# Patient Record
Sex: FEMALE | Race: WHITE
[De-identification: names, ages, dates, MRNs, and addresses within clinical notes are randomized per-mention and may not be internally consistent; named-entity substitution may affect disease eponyms.]

---

## 2021-11-19 ENCOUNTER — HOSPITAL ENCOUNTER (OUTPATIENT)
Dept: HOSPITAL 61 - ONCLAB | Age: 76
End: 2021-11-19
Attending: INTERNAL MEDICINE
Payer: MEDICARE

## 2021-11-19 DIAGNOSIS — C90.00: Primary | ICD-10-CM

## 2021-11-19 LAB
ALBUMIN SERPL-MCNC: 2.8 G/DL (ref 3.4–5)
ALBUMIN/GLOB SERPL: 0.4 {RATIO} (ref 1–1.7)
ALP SERPL-CCNC: 78 U/L (ref 46–116)
ALT SERPL-CCNC: 24 U/L (ref 14–59)
ANION GAP SERPL CALC-SCNC: 7 MMOL/L (ref 6–14)
AST SERPL-CCNC: 19 U/L (ref 15–37)
BASOPHILS # BLD AUTO: 0 X10^3/UL (ref 0–0.2)
BASOPHILS NFR BLD: 1 % (ref 0–3)
BILIRUB SERPL-MCNC: 0.4 MG/DL (ref 0.2–1)
BUN SERPL-MCNC: 20 MG/DL (ref 7–20)
BUN/CREAT SERPL: 20 (ref 6–20)
CALCIUM SERPL-MCNC: 9.4 MG/DL (ref 8.5–10.1)
CHLORIDE SERPL-SCNC: 96 MMOL/L (ref 98–107)
CO2 SERPL-SCNC: 30 MMOL/L (ref 21–32)
CREAT SERPL-MCNC: 1 MG/DL (ref 0.6–1)
EOSINOPHIL NFR BLD: 0 X10^3/UL (ref 0–0.7)
EOSINOPHIL NFR BLD: 1 % (ref 0–3)
ERYTHROCYTE [DISTWIDTH] IN BLOOD BY AUTOMATED COUNT: 15.5 % (ref 11.5–14.5)
GFR SERPLBLD BASED ON 1.73 SQ M-ARVRAT: 54.2 ML/MIN
GLUCOSE SERPL-MCNC: 92 MG/DL (ref 70–99)
HCT VFR BLD CALC: 26.6 % (ref 36–47)
HGB BLD-MCNC: 8.8 G/DL (ref 12–15.5)
LYMPHOCYTES # BLD: 1.9 X10^3/UL (ref 1–4.8)
LYMPHOCYTES NFR BLD AUTO: 37 % (ref 24–48)
MCH RBC QN AUTO: 35 PG (ref 25–35)
MCHC RBC AUTO-ENTMCNC: 33 G/DL (ref 31–37)
MCV RBC AUTO: 105 FL (ref 79–100)
MONO #: 0.4 X10^3/UL (ref 0–1.1)
MONOCYTES NFR BLD: 8 % (ref 0–9)
NEUT #: 2.8 X10^3/UL (ref 1.8–7.7)
NEUTROPHILS NFR BLD AUTO: 54 % (ref 31–73)
PLATELET # BLD AUTO: 311 X10^3/UL (ref 140–400)
POTASSIUM SERPL-SCNC: 3.6 MMOL/L (ref 3.5–5.1)
PROT SERPL-MCNC: 9.3 G/DL (ref 6.4–8.2)
RBC # BLD AUTO: 2.54 X10^6/UL (ref 3.5–5.4)
SODIUM SERPL-SCNC: 133 MMOL/L (ref 136–145)
WBC # BLD AUTO: 5.2 X10^3/UL (ref 4–11)

## 2021-11-19 PROCEDURE — 82232 ASSAY OF BETA-2 PROTEIN: CPT

## 2021-11-19 PROCEDURE — 82784 ASSAY IGA/IGD/IGG/IGM EACH: CPT

## 2021-11-19 PROCEDURE — 85025 COMPLETE CBC W/AUTO DIFF WBC: CPT

## 2021-11-19 PROCEDURE — 82746 ASSAY OF FOLIC ACID SERUM: CPT

## 2021-11-19 PROCEDURE — 86334 IMMUNOFIX E-PHORESIS SERUM: CPT

## 2021-11-19 PROCEDURE — 83520 IMMUNOASSAY QUANT NOS NONAB: CPT

## 2021-11-19 PROCEDURE — 84165 PROTEIN E-PHORESIS SERUM: CPT

## 2021-11-19 PROCEDURE — 82607 VITAMIN B-12: CPT

## 2021-11-19 PROCEDURE — 80053 COMPREHEN METABOLIC PANEL: CPT

## 2021-11-19 PROCEDURE — 36415 COLL VENOUS BLD VENIPUNCTURE: CPT

## 2021-11-19 PROCEDURE — 82525 ASSAY OF COPPER: CPT

## 2021-11-22 LAB
ALBUM: 3.5 G/DL (ref 2.9–4.4)
ALPHA1 GLOB SERPL ELPH-MCNC: 0.3 G/DL (ref 0–0.4)
ALPHA2 GLOB SERPL ELPH-MCNC: 0.8 G/DL (ref 0.4–1)
B-GLOBULIN SERPL ELPH-MCNC: 0.8 G/DL (ref 0.7–1.3)
GAMMA GLOB SERPL ELPH-MCNC: 3.8 G/DL (ref 0.4–1.8)
IGA SERPL-MCNC: 25 MG/DL (ref 64–422)
IGG SERPL-MCNC: 4912 MG/DL (ref 586–1602)
IGM SERPL-MCNC: 25 MG/DL (ref 26–217)
PROT SERPL-MCNC: 9.2 G/DL (ref 6–8.5)
SPEP AG RATIO: 0.6 (ref 0.7–1.7)

## 2021-11-23 LAB
KAPPA LC SERPL-MCNC: 105.4 MG/L (ref 3.3–19.4)
KAPPA LC/LAMBDA SER: 4.04 {RATIO} (ref 0.26–1.65)
LAMBDA LC FREE SERPL-MCNC: 26.1 MG/L (ref 5.7–26.3)

## 2022-01-07 ENCOUNTER — HOSPITAL ENCOUNTER (EMERGENCY)
Dept: HOSPITAL 61 - ER | Age: 77
LOS: 1 days | Discharge: HOME | End: 2022-01-08
Payer: MEDICARE

## 2022-01-07 VITALS — HEIGHT: 58 IN | WEIGHT: 118.17 LBS | BODY MASS INDEX: 24.8 KG/M2

## 2022-01-07 DIAGNOSIS — R91.1: ICD-10-CM

## 2022-01-07 DIAGNOSIS — F17.200: ICD-10-CM

## 2022-01-07 DIAGNOSIS — D53.9: Primary | ICD-10-CM

## 2022-01-07 DIAGNOSIS — C90.00: ICD-10-CM

## 2022-01-07 LAB
ALBUMIN SERPL-MCNC: 2.6 G/DL (ref 3.4–5)
ALBUMIN/GLOB SERPL: 0.4 {RATIO} (ref 1–1.7)
ALP SERPL-CCNC: 128 U/L (ref 46–116)
ALT SERPL-CCNC: 17 U/L (ref 14–59)
ANION GAP SERPL CALC-SCNC: -5 MMOL/L (ref 6–14)
APTT BLD: 26 SEC (ref 24–38)
AST SERPL-CCNC: 11 U/L (ref 15–37)
BASOPHILS # BLD AUTO: 0 X10^3/UL (ref 0–0.2)
BASOPHILS NFR BLD: 1 % (ref 0–3)
BILIRUB SERPL-MCNC: 0.2 MG/DL (ref 0.2–1)
BUN SERPL-MCNC: 12 MG/DL (ref 7–20)
BUN/CREAT SERPL: 17 (ref 6–20)
CALCIUM SERPL-MCNC: 8.2 MG/DL (ref 8.5–10.1)
CHLORIDE SERPL-SCNC: 99 MMOL/L (ref 98–107)
CO2 SERPL-SCNC: 43 MMOL/L (ref 21–32)
CREAT SERPL-MCNC: 0.7 MG/DL (ref 0.6–1)
EOSINOPHIL NFR BLD: 0 X10^3/UL (ref 0–0.7)
EOSINOPHIL NFR BLD: 1 % (ref 0–3)
ERYTHROCYTE [DISTWIDTH] IN BLOOD BY AUTOMATED COUNT: 15.8 % (ref 11.5–14.5)
GFR SERPLBLD BASED ON 1.73 SQ M-ARVRAT: 81.8 ML/MIN
GLUCOSE SERPL-MCNC: 137 MG/DL (ref 70–99)
HCT VFR BLD CALC: 22.9 % (ref 36–47)
HGB BLD-MCNC: 7.9 G/DL (ref 12–15.5)
LYMPHOCYTES # BLD: 1.5 X10^3/UL (ref 1–4.8)
LYMPHOCYTES NFR BLD AUTO: 37 % (ref 24–48)
MCH RBC QN AUTO: 35 PG (ref 25–35)
MCHC RBC AUTO-ENTMCNC: 34 G/DL (ref 31–37)
MCV RBC AUTO: 103 FL (ref 79–100)
MONO #: 0.4 X10^3/UL (ref 0–1.1)
MONOCYTES NFR BLD: 10 % (ref 0–9)
NEUT #: 2 X10^3/UL (ref 1.8–7.7)
NEUTROPHILS NFR BLD AUTO: 51 % (ref 31–73)
PLATELET # BLD AUTO: 313 X10^3/UL (ref 140–400)
POTASSIUM SERPL-SCNC: 3.3 MMOL/L (ref 3.5–5.1)
PROT SERPL-MCNC: 8.5 G/DL (ref 6.4–8.2)
PROTHROMBIN TIME: 13.2 SEC (ref 11.7–14)
RBC # BLD AUTO: 2.22 X10^6/UL (ref 3.5–5.4)
SODIUM SERPL-SCNC: 137 MMOL/L (ref 136–145)
WBC # BLD AUTO: 4 X10^3/UL (ref 4–11)

## 2022-01-07 PROCEDURE — 85025 COMPLETE CBC W/AUTO DIFF WBC: CPT

## 2022-01-07 PROCEDURE — 84484 ASSAY OF TROPONIN QUANT: CPT

## 2022-01-07 PROCEDURE — 71275 CT ANGIOGRAPHY CHEST: CPT

## 2022-01-07 PROCEDURE — 86850 RBC ANTIBODY SCREEN: CPT

## 2022-01-07 PROCEDURE — 93005 ELECTROCARDIOGRAM TRACING: CPT

## 2022-01-07 PROCEDURE — 96361 HYDRATE IV INFUSION ADD-ON: CPT

## 2022-01-07 PROCEDURE — 85018 HEMOGLOBIN: CPT

## 2022-01-07 PROCEDURE — 96374 THER/PROPH/DIAG INJ IV PUSH: CPT

## 2022-01-07 PROCEDURE — 71045 X-RAY EXAM CHEST 1 VIEW: CPT

## 2022-01-07 PROCEDURE — 85730 THROMBOPLASTIN TIME PARTIAL: CPT

## 2022-01-07 PROCEDURE — 96375 TX/PRO/DX INJ NEW DRUG ADDON: CPT

## 2022-01-07 PROCEDURE — 85610 PROTHROMBIN TIME: CPT

## 2022-01-07 PROCEDURE — 80053 COMPREHEN METABOLIC PANEL: CPT

## 2022-01-07 PROCEDURE — 36415 COLL VENOUS BLD VENIPUNCTURE: CPT

## 2022-01-07 PROCEDURE — 86900 BLOOD TYPING SEROLOGIC ABO: CPT

## 2022-01-07 PROCEDURE — 86901 BLOOD TYPING SEROLOGIC RH(D): CPT

## 2022-01-07 PROCEDURE — 99285 EMERGENCY DEPT VISIT HI MDM: CPT

## 2022-01-07 NOTE — PHYS DOC
Past Medical History


Additional Past Medical Histor:  BACTERIAL BLOOD INFECTION 


Past Surgical History:  Other


Additional Past Surgical Histo:  LOWER SIGMOID COLON , LEFT SHOULDER ABCESS 

REMOVAL 


Smoking Status:  Current Some Day Smoker


Alcohol Use:  None





General Adult


EDM:


Chief Complaint:  ABNORMAL LABS





HPI:


HPI:


76-year-old female past medical history of hypothyroidism, multiple myeloma, 

COPD on 3 L nasal cannula and hypertension, presents to the ED with her son, c/o

low hemoglobin (lab paperwork with her). Patient follows with Dr. Delgado 

and H/H was checked , was 7.1/21.9 and  was 6.8/22. Patient with no 

prior history of blood transfusions. Not on any anticoagulants. Presents to the 

ED from [facility] rehab after patient was recently admitted to the hospital for 

acute respiratory failure with hypoxia (2/2 midl CHF and copd). Is not 

vaccinated for COVID. Has not had a positive COVID test. Patient does complain 

of exertional dyspnea and shortness of breath at rest. EMR was reviewed and 

patient's hemoglobin was 7.6 on . Pt reports h/o anxiety that she 

takes medications for, requests anxiety medication on arrival. No h/o liver 

disease of GI bleeding.





Review of Systems:


Review of Systems:


Constitutional:   Denies fever or chills. []


Eyes:   Denies change in visual acuity. []


HENT:   Denies nasal congestion or sore throat. [] 


Respiratory:   Denies cough or hemoptysis


Cardiovascular:   Denies chest pain or edema. [] 


GI:   Denies melena, hematochezia or hematemesis


:  Denies incontinence or saddle anesthesia


Musculoskeletal:   Denies back pain or joint pain. [] 


Integument:   Denies rash or diaphoresis


Neurologic:   Denies headache, focal weakness or sensory changes. [] 


Endocrine:   Denies polyuria or polydipsia. [] 


Lymphatic:  Denies swollen glands. [] 


Psychiatric:  Denies depression or anxiety. []





Heart Score:


C/O Chest Pain:  No


Risk Factors:


Risk Factors:  DM, Current or recent (<one month) smoker, HTN, HLP, family 

history of CAD, obesity.


Risk Scores:


Score 0 - 3:  2.5% MACE over next 6 weeks - Discharge Home


Score 4 - 6:  20.3% MACE over next 6 weeks - Admit for Clinical Observation


Score 7 - 10:  72.7% MACE over next 6 weeks - Early Invasive Strategies





Allergies:


Allergies:





Allergies








Coded Allergies Type Severity Reaction Last Updated Verified


 


  cetirizine Allergy Severe Swelling 21 Yes











Physical Exam:


PE:





Constitutional: Well developed, well nourished, no acute distress, non-toxic 

appearance. 


HENT: Normocephalic, atraumatic,


Eyes: EOMI, conjunctiva normal, no discharge.  


Neck: Normal range of motion,  supple, 


Cardiovascular: S1/2 present, regular rhythm


Lungs & Thorax: Speaking in full sentences, bilateral equal chest rise, no 

tachypnea or increased work of breathing, on 3L NC


Abdomen:  soft, no tenderness, 


Skin: Warm, dry, no erythema, no rash. [] 


Extremities: No tenderness, no cyanosis, 


Neurologic: Alert -has some forgetfulness, suspect early dementia, normal motor 

function, normal sensory function, no focal deficits noted. []


Psychologic: Affect normal, judgement normal, mood normal. []





EKG:


EKG:


Sinus rhythm 97 bpm, left axis deviation, normal intervals, T wave inversion 

lead III, PVC present, no ST elevation or ST depression, no active chest pain





Radiology/Procedures:


Radiology/Procedures:


                                 IMAGING REPORT





                                     Signed





PATIENT: MORALES URBINA     ACCOUNT: [id number]     MRN#: [id number]


: 1945           LOCATION: ER              AGE: 76


SEX: F                    EXAM DT: 22         ACCESSION#: [id number]


STATUS: REG ER            ORD. PHYSICIAN: ISAAC RODRIGUEZ DO


REASON: dypnea


PROCEDURE: CHEST AP ONLY





Exam: Chest one view





INDICATION: Dyspnea





TECHNIQUE: Frontal view of the chest





Comparisons: 2020





FINDINGS:


The cardiomediastinal silhouette and pulmonary vessels are within normal limits.





The lung and pleural spaces are clear.





IMPRESSION:


No acute cardiopulmonary process.





Electronically signed by: Karla Ortiz MD (2022 9:59 PM) [facility]














DICTATED and SIGNED BY:     KARLA ORTIZ MD


DATE:     22 [id number] 0


                                        


                                 IMAGING REPORT





                                     Signed





PATIENT: MORALES URBINA     ACCOUNT: [id number]     MRN#: [id number]


: 1945           LOCATION: ER              AGE: 76


SEX: F                    EXAM DT: 22         ACCESSION#: [id number]


STATUS: REG ER            ORD. PHYSICIAN: ISAAC RODRIGUEZ DO


REASON: soa, r/o pe, h/o multiple myeloma, OMNI 350 80 ML IV


PROCEDURE: CT ANGIOGRAPHY CHEST





EXAM: CT chest with contrast - pulmonary embolus protocol





CLINICAL HISTORY: Reason: soa, r/o pe, h/o multiple myeloma, OMNI 350 80 ML IV /

 Spl. Instructions: IV WASNT GOING IN THEN LEAKED RESCANNED 3RD TIME WITH 70 ML 

/ History: .





COMPARISON: None.





TECHNIQUE: CT of the chest following the administration of intravenous contrast 

during the pulmonary arterial phase. Axial, coronal and sagittal reformatted 

images were generated including MIP images. Of note, contrast was leaking from 

the contrast tubing and therefore exam was repeated.





---PQRS compliance statement - One or more of the following individualized dose 

reduction techniques were utilized for this study:


1.  Automated exposure control


2.  Adjustment of the mA and/or kV according to patient size


3.  Use of iterative reconstruction technique---





FINDINGS:


CHEST: 


Diagnostic quality: Suboptimal contrast bolus despite repeating examination. 

Motion artifact also limits evaluation.


Pulmonary emboli:  No pulmonary emboli to the level of the segmental branches. 

More peripheral vessels are not well assessed.


Right heart strain: None


Pulmonary arteries: Normal in caliber.





Heart is mildly enlarged. No pleural effusion. No pneumothorax. No pericardial 

effusion.





Linear opacities lingula and medial left lower lobe likely scarring or 

atelectasis. Mild background of emphysematous change. 7 mm right upper lobe lung

 nodule (series 13 image 15). Wedge-shaped nodularity in the middle lobe is 

favored to represent scarring/atelectasis at the recommend close attention on 

follow-up.





Visualized Upper abdomen:  Unremarkable





Bones: Multilevel degenerative changes of the spine are seen. Height loss of 

several mid-lower thoracic levels, age-indeterminate compression fractures but 

grossly stable to 2021.





IMPRESSION:


1.  No pulmonary emboli to the level of the segmental branches. More peripheral 

vessels are not well assessed.


2.  7 mm right upper lobe lung nodule. Per Fleischner Society guidelines for 

incidentally found solid nodule measuring 6-8 mm, initial CT follow-up is 

recommended in 6-12 months. Additional follow-up can be considered in 18-24 

month based on risk factors.


3.  Wedge-shaped nodularity in the middle lobe likely scarring or atelectasis 

however recommend close attention on follow-up.





Electronically signed by: Kleber Leach MD (2022 12:34 AM) [facility]














DICTATED and SIGNED BY:     KLEBER LEACH MD


DATE:     22 [id number] 0





Course & Med Decision Making:


Course & Med Decision Making


Pertinent Labs and Imaging studies reviewed. (See chart for details)





Concern for shortness of breath in the setting of macrocytic anemia, hemoglobin 

7.9 and 8 on repeat labs.  Patient on her current baseline oxygen.  CT of the 

chest did not show any pulmonary embolus but did show incidental right upper 

lung nodule.  Imaging report printed and given to take with patient to her PCP 

for follow-up.  Patient with no signs of active bleeding.  No indication for 

transfusion at this time.  Will discharge home with strict ED return precautions

 were given for syncope, chest pain, hemoptysis, increased work of breathing or 

worsening shortness of breath. Encouraged urgent outpatient follow-up with PMD 

and hematology/oncology for management of multiple myeloma and anemia.  Life-

threatening processes were considered but are low suspicion at this time, given 

history, physical exam and ED workup. Pt was educated on all prescription 

medications and adverse effects.  All patient's questions were answered and pt 

was stable at time of discharge.





Life/limb-threatening differential includes but is not limited to, ACS, 

dysrhythmia, pneumothorax or hemothorax, pulmonary embolus, pneumonia, 

bronchoconstriction, pulmonary edema, angioedema, epiglottitis, tracheitis, 

Devon's angina, RPA/PTA, anaphylaxis, angioedema, cardiac tamponade or murmurs,

 pericarditis, myocarditis, poisoning or toxicity, sepsis or 

autoimmune/neurologic disease.





I have spoken with the patient and/or caregivers.  I explained the patient's 

condition, diagnoses and treatment plan based on the information available to me

 at this time.  I have answered the patient and/or caregiver's questions and 

addressed any concerns.  The patient and/or caregivers have a good understanding

 of patient's diagnosis, condition and treatment plan as can be expected at this

 point.  Vital signs have been stable.  Patient's condition is stable and 

appropriate for discharge from the emergency department. 





Patient will pursue further outpatient evaluation with primary care physician or

 other designated or consulting physician as outlined in the discharge 

instructions.  The patient and/or caregivers are agreeable to this plan of care 

and follow-up instructions have been explained in detail.  The patient and/or 

caregivers have received these instructions in written form and have expressed 

an understanding of the discharge instructions.  The patient and/or caregivers 

are aware that any significant change of condition or worsening of symptoms 

should prompt immediate return to this or the closest emergency department or ca

ll to 911.





Dragon Disclaimer:


Dragon Disclaimer:


This electronic medical record was generated, in whole or in part, using a voice

 recognition dictation system.





Departure


Departure


Impression:  


   Primary Impression:  


   Macrocytic anemia


   Additional Impressions:  


   Multiple myeloma


   Lung nodule


Disposition:   HOME / SELF CARE / HOMELESS


Condition:  STABLE


Referrals:  


INGRID LEVINE MD (PCP)


Follow-up with your primary care physician in 24 to 48 hours OR


FOLLOW UP WITH FAMILY MEDICINE:


[address]


[place] [address]


Phone: [phone]


Patient Instructions:  Anemia, Nonspecific-Brief, Multiple Myeloma





Additional Instructions:  


FOLLOW UP WITH:  FOR DEFINITIVE MANAGEMENT of anemia and multiple myeloma


Hematology/Oncology


[facility]


[address]


[place] [address]


Phone: [phone]





EMERGENCY DEPARTMENT GENERAL DISCHARGE INSTRUCTIONS





Thank you for coming to [facility] Emergency Department (ED) 

today and 


trusting us with you care.  We trust that you had a positive experience in our 

Emergency 


Department.  If you wish to speak to the department management, you may call the

 Director at 


[phone].





YOUR FOLLOW UP INSTRUCTIONS ARE AS FOLLOWS:





1.  Do you have a private Doctor?  If you do not have a private doctor, please 

ask for a 


resource list of physicians or clinics that may be able to assist you with 

follow up care.





2.  The Emergency Physicain has interpreted your x-rays.  The X-Ray specialist 

will also 


review them.  If there is a change in the findings, you will be notified in 48 

hours when at 


all possible.





3.  A lab test or culture has been done, your results will be reviewed and you 

will be 


notified if you need a change in treatment.





ADDITIONAL INSTRUCTIONS AND INFORMATION:





1.  Your care today has been supervised by a physician who is specially trained 

in emergency 


care.  Many problems require more than one evaluation for a complete diagnosis 

and 


treatment.  We recommend that you schedule your follow up appointment as 

recommended to 


ensure complete treatment of you illness or injury.  If you are unable to obtain

 follow up 


care and continue to have a problem, or if your condition worsens, we recommend 

that you 


return to the ED.





2.  We are not able to safely determine your condition over the phone nor are we

 able to 


give sound medical advice over the phone.  For these safety reasons, if you call

 for medical 


advice we will ask you to come to the ED for further evaluation.





3.  If you have any questions regarding these discharge instructions please call

 the ED at 


[phone].





SAFETY INFORMATION:





In the interest of safety, wellness, and injury prevention; we encourage you to 

wear your 


sealbelt, if you smoke; quite smoking, and we encourage family to use a 

protective helmet 


for bicycling and other sporting events that present an increased risk for head 

injury.





IF YOUR SYMPTOMS WORSEN OR NEW SYMPTOMS DEVELOP, OR YOU HAVE CONCERNS ABOUT YOUR

 CONDITION; 


OR IF YOUR CONDITION WORSENS WHILE YOU ARE WAITING FOR YOUR FOLLOW UP 

APPOINTMENT; EITHER 


CONTACT YOUR PRIMARY CARE DOCTOR, THE PHYSICIAN WHOSE NAME AND NUMBER YOU WERE 

GIVEN, OR 


RETURN TO THE ED IMMEDIATELY.











ISAAC DARDEN DO                2022 20:33

## 2022-01-07 NOTE — RAD
Exam: Chest one view



INDICATION: Dyspnea



TECHNIQUE: Frontal view of the chest



Comparisons: 12/7/2020



FINDINGS:

The cardiomediastinal silhouette and pulmonary vessels are within normal limits.



The lung and pleural spaces are clear.



IMPRESSION:

No acute cardiopulmonary process.



Electronically signed by: Karla Aviles MD (1/7/2022 9:59 PM) [facility]

## 2022-01-08 VITALS — SYSTOLIC BLOOD PRESSURE: 166 MMHG | DIASTOLIC BLOOD PRESSURE: 85 MMHG

## 2022-01-08 NOTE — RAD
EXAM: CT chest with contrast - pulmonary embolus protocol



CLINICAL HISTORY: Reason: soa, r/o pe, h/o multiple myeloma, OMNI 350 80 ML IV / Spl. Instructions: I
V WASNT GOING IN THEN LEAKED RESCANNED 3RD TIME WITH 70 ML / History: .



COMPARISON: None.



TECHNIQUE: CT of the chest following the administration of intravenous contrast during the pulmonary 
arterial phase. Axial, coronal and sagittal reformatted images were generated including MIP images. O
f note, contrast was leaking from the contrast tubing and therefore exam was repeated.



---PQRS compliance statement - One or more of the following individualized dose reduction techniques 
were utilized for this study:

1.  Automated exposure control

2.  Adjustment of the mA and/or kV according to patient size

3.  Use of iterative reconstruction technique---



FINDINGS:

CHEST: 

Diagnostic quality: Suboptimal contrast bolus despite repeating examination. Motion artifact also christianson
its evaluation.

Pulmonary emboli:  No pulmonary emboli to the level of the segmental branches. More peripheral vessel
s are not well assessed.

Right heart strain: None

Pulmonary arteries: Normal in caliber.



Heart is mildly enlarged. No pleural effusion. No pneumothorax. No pericardial effusion.



Linear opacities lingula and medial left lower lobe likely scarring or atelectasis. Mild background o
f emphysematous change. 7 mm right upper lobe lung nodule (series 13 image 15). Wedge-shaped nodulari
ty in the middle lobe is favored to represent scarring/atelectasis at the recommend close attention o
n follow-up.



Visualized Upper abdomen:  Unremarkable



Bones: Multilevel degenerative changes of the spine are seen. Height loss of several mid-lower thorac
ic levels, age-indeterminate compression fractures but grossly stable to 11/24/2021.



IMPRESSION:

1.  No pulmonary emboli to the level of the segmental branches. More peripheral vessels are not well 
assessed.

2.  7 mm right upper lobe lung nodule. Per Fleischner Society guidelines for incidentally found solid
 nodule measuring 6-8 mm, initial CT follow-up is recommended in 6-12 months. Additional follow-up ca
n be considered in 18-24 month based on risk factors.

3.  Wedge-shaped nodularity in the middle lobe likely scarring or atelectasis however recommend close
 attention on follow-up.



Electronically signed by: Kleber Ruiz MD (1/8/2022 12:34 AM) [facility]

## 2022-01-08 NOTE — EKG
[facility]

              [address] [place] [address]

Test Date:    2022               Test Time:    22:24:24

Pat Name:     MORALES URBINA               Department:   

Patient ID:   [id number]           Room:          

Gender:       F                        Technician:   

:          1945               Requested By: ISAAC RODRIGUEZ

Order Number: [id number]           Reading MD:     

                                 Measurements

Intervals                              Axis          

Rate:         97                       P:            -24

NM:           132                      QRS:          -21

QRSD:         84                       T:            11

QT:           334                                    

QTc:          428                                    

                           Interpretive Statements

SINUS RHYTHM

VENTRICULAR PREMATURE COMPLEX(ES)

LEFTWARD AXIS

CONSIDER LEFT VENTRICULAR HYPERTROPHY

ST & T ABNORMALITY, CONSIDER

HIGH LATERAL ISCHEMIA OR LEFT VENTRICULAR STRAIN

ABNORMAL ECG

RI6.01

No previous ECG available for comparison

## 2022-01-10 VITALS
SYSTOLIC BLOOD PRESSURE: 126 MMHG | SYSTOLIC BLOOD PRESSURE: 126 MMHG | SYSTOLIC BLOOD PRESSURE: 126 MMHG | DIASTOLIC BLOOD PRESSURE: 71 MMHG | SYSTOLIC BLOOD PRESSURE: 126 MMHG | DIASTOLIC BLOOD PRESSURE: 71 MMHG | DIASTOLIC BLOOD PRESSURE: 71 MMHG | DIASTOLIC BLOOD PRESSURE: 71 MMHG | DIASTOLIC BLOOD PRESSURE: 71 MMHG | SYSTOLIC BLOOD PRESSURE: 126 MMHG

## 2022-01-25 ENCOUNTER — HOSPITAL ENCOUNTER (OUTPATIENT)
Dept: HOSPITAL 61 - ONCLAB | Age: 77
End: 2022-01-25
Attending: INTERNAL MEDICINE
Payer: MEDICARE

## 2022-01-25 DIAGNOSIS — C90.00: Primary | ICD-10-CM

## 2022-01-25 LAB
% BANDS: 7 % (ref 0–9)
% LYMPHS: 7 % (ref 24–48)
% MONOS: 3 % (ref 0–10)
% SEGS: 83 % (ref 35–66)
ALBUMIN SERPL-MCNC: 1.8 G/DL (ref 3.4–5)
ALBUMIN/GLOB SERPL: 0.3 {RATIO} (ref 1–1.7)
ALP SERPL-CCNC: 113 U/L (ref 46–116)
ALT SERPL-CCNC: 13 U/L (ref 14–59)
ANION GAP SERPL CALC-SCNC: 0 MMOL/L (ref 6–14)
AST SERPL-CCNC: 12 U/L (ref 15–37)
BASOPHILS # BLD AUTO: 0.1 X10^3/UL (ref 0–0.2)
BASOPHILS NFR BLD: 1 % (ref 0–3)
BILIRUB SERPL-MCNC: 0.3 MG/DL (ref 0.2–1)
BUN SERPL-MCNC: 13 MG/DL (ref 7–20)
BUN/CREAT SERPL: 14 (ref 6–20)
CALCIUM SERPL-MCNC: 8 MG/DL (ref 8.5–10.1)
CHLORIDE SERPL-SCNC: 97 MMOL/L (ref 98–107)
CO2 SERPL-SCNC: 33 MMOL/L (ref 21–32)
CREAT SERPL-MCNC: 0.9 MG/DL (ref 0.6–1)
EOSINOPHIL NFR BLD: 0 % (ref 0–3)
EOSINOPHIL NFR BLD: 0 X10^3/UL (ref 0–0.7)
ERYTHROCYTE [DISTWIDTH] IN BLOOD BY AUTOMATED COUNT: 18.9 % (ref 11.5–14.5)
GFR SERPLBLD BASED ON 1.73 SQ M-ARVRAT: 61.2 ML/MIN
GLUCOSE SERPL-MCNC: 109 MG/DL (ref 70–99)
HCT VFR BLD CALC: 24.4 % (ref 36–47)
HGB BLD-MCNC: 8 G/DL (ref 12–15.5)
LYMPHOCYTES # BLD: 0.7 X10^3/UL (ref 1–4.8)
LYMPHOCYTES NFR BLD AUTO: 8 % (ref 24–48)
MCH RBC QN AUTO: 32 PG (ref 25–35)
MCHC RBC AUTO-ENTMCNC: 33 G/DL (ref 31–37)
MCV RBC AUTO: 97 FL (ref 79–100)
MONO #: 0.6 X10^3/UL (ref 0–1.1)
MONOCYTES NFR BLD: 6 % (ref 0–9)
NEUT #: 8.4 X10^3/UL (ref 1.8–7.7)
NEUTROPHILS NFR BLD AUTO: 86 % (ref 31–73)
PLATELET # BLD AUTO: 422 X10^3/UL (ref 140–400)
PLATELET # BLD EST: ADEQUATE 10*3/UL
POTASSIUM SERPL-SCNC: 3.7 MMOL/L (ref 3.5–5.1)
PROT SERPL-MCNC: 7.8 G/DL (ref 6.4–8.2)
RBC # BLD AUTO: 2.52 X10^6/UL (ref 3.5–5.4)
SODIUM SERPL-SCNC: 130 MMOL/L (ref 136–145)
WBC # BLD AUTO: 9.8 X10^3/UL (ref 4–11)

## 2022-01-25 PROCEDURE — 36415 COLL VENOUS BLD VENIPUNCTURE: CPT

## 2022-01-25 PROCEDURE — 80053 COMPREHEN METABOLIC PANEL: CPT

## 2022-01-25 PROCEDURE — 82784 ASSAY IGA/IGD/IGG/IGM EACH: CPT

## 2022-01-25 PROCEDURE — 84165 PROTEIN E-PHORESIS SERUM: CPT

## 2022-01-25 PROCEDURE — 86334 IMMUNOFIX E-PHORESIS SERUM: CPT

## 2022-01-25 PROCEDURE — 85025 COMPLETE CBC W/AUTO DIFF WBC: CPT

## 2022-01-25 PROCEDURE — 83520 IMMUNOASSAY QUANT NOS NONAB: CPT

## 2022-01-25 PROCEDURE — 85007 BL SMEAR W/DIFF WBC COUNT: CPT

## 2022-01-26 LAB
ALBUM: 2.5 G/DL (ref 2.9–4.4)
ALPHA1 GLOB SERPL ELPH-MCNC: 0.4 G/DL (ref 0–0.4)
ALPHA2 GLOB SERPL ELPH-MCNC: 0.9 G/DL (ref 0.4–1)
B-GLOBULIN SERPL ELPH-MCNC: 0.7 G/DL (ref 0.7–1.3)
GAMMA GLOB SERPL ELPH-MCNC: 2.4 G/DL (ref 0.4–1.8)
IGA SERPL-MCNC: 45 MG/DL (ref 64–422)
IGG SERPL-MCNC: 3067 MG/DL (ref 586–1602)
IGM SERPL-MCNC: 22 MG/DL (ref 26–217)
KAPPA LC SERPL-MCNC: 48.1 MG/L (ref 3.3–19.4)
KAPPA LC/LAMBDA SER: 2.39 {RATIO} (ref 0.26–1.65)
LAMBDA LC FREE SERPL-MCNC: 20.1 MG/L (ref 5.7–26.3)
PROT SERPL-MCNC: 6.9 G/DL (ref 6–8.5)
SPEP AG RATIO: 0.6 (ref 0.7–1.7)

## 2022-01-28 ENCOUNTER — HOSPITAL ENCOUNTER (OUTPATIENT)
Dept: HOSPITAL 61 - US | Age: 77
End: 2022-01-28
Attending: INTERNAL MEDICINE
Payer: MEDICARE

## 2022-01-28 DIAGNOSIS — R16.0: ICD-10-CM

## 2022-01-28 DIAGNOSIS — I70.0: ICD-10-CM

## 2022-01-28 DIAGNOSIS — N28.1: ICD-10-CM

## 2022-01-28 DIAGNOSIS — K80.20: Primary | ICD-10-CM

## 2022-01-28 PROCEDURE — 76700 US EXAM ABDOM COMPLETE: CPT

## 2022-01-28 NOTE — RAD
EXAM: Abdomen sonogram.



HISTORY: Pain.



TECHNIQUE: Sonographic imaging of the abdomen was performed.



COMPARISON: CT dated 11/27/2021.



FINDINGS: The liver is enlarged. There is coarse hepatic echotexture. No focal hepatic lesion is seen
. There is cholelithiasis. There is no evidence of cholecystitis. The common bile duct is normal in c
aliber for patient age. There is a 2.9 cm cyst within the inferior left kidney. The right kidney is u
nremarkable. The pancreas, spleen, aorta and inferior vena cava are partially obscured due to bowel g
as and patient immobility. There is aortic atherosclerosis.



IMPRESSION:

1. Cholelithiasis. There is no convincing cholecystitis.

2. Enlarged liver with coarse hepatic echotexture. No discrete hepatic lesion is seen.

3. 2.9 cm left renal cyst. This is simple in appearance on the comparison CT.

4. Obscured midline structures due to bowel gas and patient immobility.



Electronically signed by: Tiana Doyle MD (1/28/2022 10:30 AM) [id number]

## 2022-02-04 ENCOUNTER — HOSPITAL ENCOUNTER (OUTPATIENT)
Dept: HOSPITAL 61 - ONCLAB | Age: 77
End: 2022-02-04
Attending: INTERNAL MEDICINE
Payer: MEDICARE

## 2022-02-04 DIAGNOSIS — C90.00: Primary | ICD-10-CM

## 2022-02-04 LAB
ALBUMIN SERPL-MCNC: 2.1 G/DL (ref 3.4–5)
ALBUMIN/GLOB SERPL: 0.4 {RATIO} (ref 1–1.7)
ALP SERPL-CCNC: 110 U/L (ref 46–116)
ALT SERPL-CCNC: 15 U/L (ref 14–59)
ANION GAP SERPL CALC-SCNC: 0 MMOL/L (ref 6–14)
AST SERPL-CCNC: 8 U/L (ref 15–37)
BASOPHILS # BLD AUTO: 0.1 X10^3/UL (ref 0–0.2)
BASOPHILS NFR BLD: 1 % (ref 0–3)
BILIRUB SERPL-MCNC: 0.1 MG/DL (ref 0.2–1)
BUN SERPL-MCNC: 13 MG/DL (ref 7–20)
BUN/CREAT SERPL: 14 (ref 6–20)
CALCIUM SERPL-MCNC: 7.7 MG/DL (ref 8.5–10.1)
CHLORIDE SERPL-SCNC: 98 MMOL/L (ref 98–107)
CO2 SERPL-SCNC: 33 MMOL/L (ref 21–32)
CREAT SERPL-MCNC: 0.9 MG/DL (ref 0.6–1)
EOSINOPHIL NFR BLD: 0 % (ref 0–3)
EOSINOPHIL NFR BLD: 0 X10^3/UL (ref 0–0.7)
ERYTHROCYTE [DISTWIDTH] IN BLOOD BY AUTOMATED COUNT: 19.4 % (ref 11.5–14.5)
GFR SERPLBLD BASED ON 1.73 SQ M-ARVRAT: 61.2 ML/MIN
GLUCOSE SERPL-MCNC: 109 MG/DL (ref 70–99)
HCT VFR BLD CALC: 23.9 % (ref 36–47)
HGB BLD-MCNC: 7.8 G/DL (ref 12–15.5)
LYMPHOCYTES # BLD: 0.9 X10^3/UL (ref 1–4.8)
LYMPHOCYTES NFR BLD AUTO: 21 % (ref 24–48)
MCH RBC QN AUTO: 32 PG (ref 25–35)
MCHC RBC AUTO-ENTMCNC: 33 G/DL (ref 31–37)
MCV RBC AUTO: 98 FL (ref 79–100)
MONO #: 0.3 X10^3/UL (ref 0–1.1)
MONOCYTES NFR BLD: 8 % (ref 0–9)
NEUT #: 2.9 X10^3/UL (ref 1.8–7.7)
NEUTROPHILS NFR BLD AUTO: 70 % (ref 31–73)
PLATELET # BLD AUTO: 330 X10^3/UL (ref 140–400)
POTASSIUM SERPL-SCNC: 3.8 MMOL/L (ref 3.5–5.1)
PROT SERPL-MCNC: 7.3 G/DL (ref 6.4–8.2)
RBC # BLD AUTO: 2.44 X10^6/UL (ref 3.5–5.4)
SODIUM SERPL-SCNC: 131 MMOL/L (ref 136–145)
WBC # BLD AUTO: 4.2 X10^3/UL (ref 4–11)

## 2022-02-04 PROCEDURE — 85025 COMPLETE CBC W/AUTO DIFF WBC: CPT

## 2022-02-04 PROCEDURE — 36415 COLL VENOUS BLD VENIPUNCTURE: CPT

## 2022-02-04 PROCEDURE — 80053 COMPREHEN METABOLIC PANEL: CPT

## 2022-02-11 ENCOUNTER — HOSPITAL ENCOUNTER (OUTPATIENT)
Dept: HOSPITAL 61 - ONCLAB | Age: 77
End: 2022-02-11
Attending: INTERNAL MEDICINE
Payer: MEDICARE

## 2022-02-11 DIAGNOSIS — C90.00: Primary | ICD-10-CM

## 2022-02-11 LAB
ALBUMIN SERPL-MCNC: 2.4 G/DL (ref 3.4–5)
ALBUMIN/GLOB SERPL: 0.5 {RATIO} (ref 1–1.7)
ALP SERPL-CCNC: 98 U/L (ref 46–116)
ALT SERPL-CCNC: 11 U/L (ref 14–59)
ANION GAP SERPL CALC-SCNC: 2 MMOL/L (ref 6–14)
AST SERPL-CCNC: 8 U/L (ref 15–37)
BASOPHILS # BLD AUTO: 0.1 X10^3/UL (ref 0–0.2)
BASOPHILS NFR BLD: 2 % (ref 0–3)
BILIRUB SERPL-MCNC: 0.2 MG/DL (ref 0.2–1)
BUN SERPL-MCNC: 13 MG/DL (ref 7–20)
BUN/CREAT SERPL: 14 (ref 6–20)
CALCIUM SERPL-MCNC: 7.6 MG/DL (ref 8.5–10.1)
CHLORIDE SERPL-SCNC: 100 MMOL/L (ref 98–107)
CO2 SERPL-SCNC: 34 MMOL/L (ref 21–32)
CREAT SERPL-MCNC: 0.9 MG/DL (ref 0.6–1)
EOSINOPHIL NFR BLD: 0 % (ref 0–3)
EOSINOPHIL NFR BLD: 0 X10^3/UL (ref 0–0.7)
ERYTHROCYTE [DISTWIDTH] IN BLOOD BY AUTOMATED COUNT: 19.9 % (ref 11.5–14.5)
GFR SERPLBLD BASED ON 1.73 SQ M-ARVRAT: 61.2 ML/MIN
GLUCOSE SERPL-MCNC: 99 MG/DL (ref 70–99)
HCT VFR BLD CALC: 23.6 % (ref 36–47)
HGB BLD-MCNC: 7.7 G/DL (ref 12–15.5)
LYMPHOCYTES # BLD: 0.8 X10^3/UL (ref 1–4.8)
LYMPHOCYTES NFR BLD AUTO: 23 % (ref 24–48)
MCH RBC QN AUTO: 32 PG (ref 25–35)
MCHC RBC AUTO-ENTMCNC: 33 G/DL (ref 31–37)
MCV RBC AUTO: 99 FL (ref 79–100)
MONO #: 0.3 X10^3/UL (ref 0–1.1)
MONOCYTES NFR BLD: 8 % (ref 0–9)
NEUT #: 2.3 X10^3/UL (ref 1.8–7.7)
NEUTROPHILS NFR BLD AUTO: 67 % (ref 31–73)
PLATELET # BLD AUTO: 234 X10^3/UL (ref 140–400)
POTASSIUM SERPL-SCNC: 4.2 MMOL/L (ref 3.5–5.1)
PROT SERPL-MCNC: 7.1 G/DL (ref 6.4–8.2)
RBC # BLD AUTO: 2.39 X10^6/UL (ref 3.5–5.4)
SODIUM SERPL-SCNC: 136 MMOL/L (ref 136–145)
WBC # BLD AUTO: 3.5 X10^3/UL (ref 4–11)

## 2022-02-11 PROCEDURE — 36415 COLL VENOUS BLD VENIPUNCTURE: CPT

## 2022-02-11 PROCEDURE — 80053 COMPREHEN METABOLIC PANEL: CPT

## 2022-02-11 PROCEDURE — 85025 COMPLETE CBC W/AUTO DIFF WBC: CPT

## 2022-02-21 ENCOUNTER — HOSPITAL ENCOUNTER (OUTPATIENT)
Dept: HOSPITAL 61 - ONCLAB | Age: 77
End: 2022-02-21
Attending: INTERNAL MEDICINE
Payer: MEDICARE

## 2022-02-21 DIAGNOSIS — C90.00: Primary | ICD-10-CM

## 2022-02-21 LAB
ALBUMIN SERPL-MCNC: 2.7 G/DL (ref 3.4–5)
ALBUMIN/GLOB SERPL: 0.6 {RATIO} (ref 1–1.7)
ALP SERPL-CCNC: 91 U/L (ref 46–116)
ALT SERPL-CCNC: 16 U/L (ref 14–59)
ANION GAP SERPL CALC-SCNC: 5 MMOL/L (ref 6–14)
AST SERPL-CCNC: < 5 U/L (ref 15–37)
BASOPHILS # BLD AUTO: 0 X10^3/UL (ref 0–0.2)
BASOPHILS NFR BLD: 1 % (ref 0–3)
BILIRUB SERPL-MCNC: 0.2 MG/DL (ref 0.2–1)
BUN SERPL-MCNC: 12 MG/DL (ref 7–20)
BUN/CREAT SERPL: 15 (ref 6–20)
CALCIUM SERPL-MCNC: 7.8 MG/DL (ref 8.5–10.1)
CHLORIDE SERPL-SCNC: 98 MMOL/L (ref 98–107)
CO2 SERPL-SCNC: 31 MMOL/L (ref 21–32)
CREAT SERPL-MCNC: 0.8 MG/DL (ref 0.6–1)
EOSINOPHIL NFR BLD: 0 X10^3/UL (ref 0–0.7)
EOSINOPHIL NFR BLD: 1 % (ref 0–3)
ERYTHROCYTE [DISTWIDTH] IN BLOOD BY AUTOMATED COUNT: 20 % (ref 11.5–14.5)
GFR SERPLBLD BASED ON 1.73 SQ M-ARVRAT: 70.1 ML/MIN
GLUCOSE SERPL-MCNC: 94 MG/DL (ref 70–99)
HCT VFR BLD CALC: 26.3 % (ref 36–47)
HGB BLD-MCNC: 8.7 G/DL (ref 12–15.5)
LYMPHOCYTES # BLD: 0.9 X10^3/UL (ref 1–4.8)
LYMPHOCYTES NFR BLD AUTO: 24 % (ref 24–48)
MCH RBC QN AUTO: 33 PG (ref 25–35)
MCHC RBC AUTO-ENTMCNC: 33 G/DL (ref 31–37)
MCV RBC AUTO: 100 FL (ref 79–100)
MONO #: 0.4 X10^3/UL (ref 0–1.1)
MONOCYTES NFR BLD: 10 % (ref 0–9)
NEUT #: 2.4 X10^3/UL (ref 1.8–7.7)
NEUTROPHILS NFR BLD AUTO: 65 % (ref 31–73)
PLATELET # BLD AUTO: 289 X10^3/UL (ref 140–400)
POTASSIUM SERPL-SCNC: 3.8 MMOL/L (ref 3.5–5.1)
PROT SERPL-MCNC: 7.3 G/DL (ref 6.4–8.2)
RBC # BLD AUTO: 2.62 X10^6/UL (ref 3.5–5.4)
SODIUM SERPL-SCNC: 134 MMOL/L (ref 136–145)
WBC # BLD AUTO: 3.7 X10^3/UL (ref 4–11)

## 2022-02-21 PROCEDURE — 86334 IMMUNOFIX E-PHORESIS SERUM: CPT

## 2022-02-21 PROCEDURE — 36415 COLL VENOUS BLD VENIPUNCTURE: CPT

## 2022-02-21 PROCEDURE — 84165 PROTEIN E-PHORESIS SERUM: CPT

## 2022-02-21 PROCEDURE — 80053 COMPREHEN METABOLIC PANEL: CPT

## 2022-02-21 PROCEDURE — 85025 COMPLETE CBC W/AUTO DIFF WBC: CPT

## 2022-02-21 PROCEDURE — 83520 IMMUNOASSAY QUANT NOS NONAB: CPT

## 2022-02-21 PROCEDURE — 82784 ASSAY IGA/IGD/IGG/IGM EACH: CPT

## 2022-02-22 LAB
ALBUM: 3.4 G/DL (ref 2.9–4.4)
ALPHA1 GLOB SERPL ELPH-MCNC: 0.2 G/DL (ref 0–0.4)
ALPHA2 GLOB SERPL ELPH-MCNC: 0.7 G/DL (ref 0.4–1)
B-GLOBULIN SERPL ELPH-MCNC: 0.6 G/DL (ref 0.7–1.3)
GAMMA GLOB SERPL ELPH-MCNC: 1.9 G/DL (ref 0.4–1.8)
IGA SERPL-MCNC: 21 MG/DL (ref 64–422)
IGG SERPL-MCNC: 2314 MG/DL (ref 586–1602)
IGM SERPL-MCNC: 19 MG/DL (ref 26–217)
PROT SERPL-MCNC: 6.8 G/DL (ref 6–8.5)
SPEP AG RATIO: 1 (ref 0.7–1.7)

## 2022-02-23 LAB
KAPPA LC SERPL-MCNC: 24.8 MG/L (ref 3.3–19.4)
KAPPA LC/LAMBDA SER: 2.12 {RATIO} (ref 0.26–1.65)
LAMBDA LC FREE SERPL-MCNC: 11.7 MG/L (ref 5.7–26.3)